# Patient Record
Sex: MALE | Race: BLACK OR AFRICAN AMERICAN | NOT HISPANIC OR LATINO | Employment: UNEMPLOYED | URBAN - METROPOLITAN AREA
[De-identification: names, ages, dates, MRNs, and addresses within clinical notes are randomized per-mention and may not be internally consistent; named-entity substitution may affect disease eponyms.]

---

## 2019-12-13 ENCOUNTER — OFFICE VISIT (OUTPATIENT)
Dept: ORTHOPEDICS | Facility: CLINIC | Age: 59
End: 2019-12-13
Payer: MEDICARE

## 2019-12-13 VITALS — WEIGHT: 209.69 LBS | BODY MASS INDEX: 31.06 KG/M2 | TEMPERATURE: 99 F | HEIGHT: 69 IN

## 2019-12-13 DIAGNOSIS — G89.29 CHRONIC PAIN OF RIGHT KNEE: Primary | ICD-10-CM

## 2019-12-13 DIAGNOSIS — M25.561 CHRONIC PAIN OF RIGHT KNEE: Primary | ICD-10-CM

## 2019-12-13 PROCEDURE — 20610 LARGE JOINT ASPIRATION/INJECTION: ICD-10-PCS | Mod: RT,S$GLB,, | Performed by: ORTHOPAEDIC SURGERY

## 2019-12-13 PROCEDURE — 20610 DRAIN/INJ JOINT/BURSA W/O US: CPT | Mod: RT,S$GLB,, | Performed by: ORTHOPAEDIC SURGERY

## 2019-12-13 PROCEDURE — 99201 PR OFFICE/OUTPT VISIT,NEW,LEVL I: CPT | Mod: 25,S$GLB,, | Performed by: ORTHOPAEDIC SURGERY

## 2019-12-13 PROCEDURE — 99201 PR OFFICE/OUTPT VISIT,NEW,LEVL I: ICD-10-PCS | Mod: 25,S$GLB,, | Performed by: ORTHOPAEDIC SURGERY

## 2019-12-13 RX ORDER — HYDROCODONE BITARTRATE AND ACETAMINOPHEN 10; 325 MG/1; MG/1
TABLET ORAL
COMMUNITY

## 2019-12-13 RX ORDER — ROFLUMILAST 500 UG/1
TABLET ORAL
COMMUNITY

## 2019-12-13 RX ORDER — TRIAMCINOLONE ACETONIDE 40 MG/ML
40 INJECTION, SUSPENSION INTRA-ARTICULAR; INTRAMUSCULAR
Status: DISCONTINUED | OUTPATIENT
Start: 2019-12-13 | End: 2019-12-13 | Stop reason: HOSPADM

## 2019-12-13 RX ORDER — TRANDOLAPRIL 4 MG/1
TABLET ORAL
COMMUNITY

## 2019-12-13 RX ORDER — MIRTAZAPINE 45 MG/1
TABLET, FILM COATED ORAL
COMMUNITY

## 2019-12-13 RX ORDER — QUETIAPINE FUMARATE 200 MG/1
TABLET, FILM COATED ORAL
COMMUNITY

## 2019-12-13 RX ORDER — IBUPROFEN 600 MG/1
600 TABLET ORAL 3 TIMES DAILY
COMMUNITY

## 2019-12-13 RX ORDER — ATORVASTATIN CALCIUM 80 MG/1
TABLET, FILM COATED ORAL
COMMUNITY

## 2019-12-13 RX ADMIN — TRIAMCINOLONE ACETONIDE 40 MG: 40 INJECTION, SUSPENSION INTRA-ARTICULAR; INTRAMUSCULAR at 09:12

## 2019-12-13 NOTE — PROGRESS NOTES
Subjective:      Patient ID: Drew Downing is a 59 y.o. male.    Chief Complaint: Pain of the Right Knee    HPI 59-year-old man with a two-month history of right knee pain which he describes as aching pain. He walks with the assistance of a cane.  He underwent a right total knee arthroplasty in 2006 and has done well until recently    Review of Systems   Constitution: Negative for fever and weight loss.   Cardiovascular: Negative for chest pain and leg swelling.   Musculoskeletal: Positive for arthritis, joint pain, joint swelling and stiffness. Negative for muscle weakness.   Gastrointestinal: Negative for change in bowel habit.   Genitourinary: Negative for bladder incontinence and hematuria.   Neurological: Negative for focal weakness, numbness, paresthesias and sensory change.         Objective:      Examination of the right knee shows a well-healed anterior scar.  There is no effusion.  He has global tenderness with palpation.  He has mild varus alignment on standing.      Ortho/SPM Exam            Assessment:       Encounter Diagnosis   Name Primary?    Chronic pain of right knee Yes          Plan:       Drew was seen today for pain.    Diagnoses and all orders for this visit:    Chronic pain of right knee  -     X-Ray Knee 1 or 2 View Right; Future     AP and lateral of the right knee shows a less than 1 mm lucency beneath the tibial component. There is no evidence of gross loosening.    The right knee is injected today.  Return 1 month for recheck

## 2019-12-13 NOTE — PROCEDURES
Large Joint Aspiration/Injection  Date/Time: 12/13/2019 9:30 AM  Performed by: Cesar Condon MD  Authorized by: Cesar Condon MD     Consent Done?:  Yes (Verbal)  Indications:  Pain  Timeout: Prior to procedure the correct patient, procedure, and site was verified    Anesthesia  Local anesthesia used  Anesthetic: lidocaine 1% without epinephrine  Anesthetic total: 2mL    Location:  Knee  Prep: Patient was prepped and draped in usual sterile fashion    Needle size:  25 G  Ultrasonic Guidance for needle placement: No  Approach:  Anteromedial  Medications:  40 mg triamcinolone acetonide 40 mg/mL  Patient tolerance:  Patient tolerated the procedure well with no immediate complications

## 2020-01-14 ENCOUNTER — TELEPHONE (OUTPATIENT)
Dept: ORTHOPEDICS | Facility: CLINIC | Age: 60
End: 2020-01-14

## 2020-01-14 NOTE — TELEPHONE ENCOUNTER
----- Message from Elisa Roach sent at 1/10/2020  1:55 PM CST -----  Tramadol is giving him stomach pains

## 2020-01-14 NOTE — TELEPHONE ENCOUNTER
1/14/20  11:00am   Spoke w/ patient.    Per Dr. Condon - Stop Tramadol.   Patient is referred back to PCP for any stronger pain medication.

## 2020-12-10 ENCOUNTER — HISTORICAL (OUTPATIENT)
Dept: ADMINISTRATIVE | Facility: HOSPITAL | Age: 60
End: 2020-12-10

## 2020-12-23 ENCOUNTER — HISTORICAL (OUTPATIENT)
Dept: RADIOLOGY | Facility: HOSPITAL | Age: 60
End: 2020-12-23

## 2022-04-10 ENCOUNTER — HISTORICAL (OUTPATIENT)
Dept: ADMINISTRATIVE | Facility: HOSPITAL | Age: 62
End: 2022-04-10
Payer: MEDICAID

## 2022-04-26 VITALS
HEIGHT: 71 IN | WEIGHT: 217.13 LBS | BODY MASS INDEX: 30.4 KG/M2 | DIASTOLIC BLOOD PRESSURE: 83 MMHG | SYSTOLIC BLOOD PRESSURE: 134 MMHG

## 2022-05-03 NOTE — HISTORICAL [facility] CERNER
This is a historical note converted from Shameka. Formatting and pictures may have been removed.  Please reference Shameka for original formatting and attached multimedia. Procedure Name  Date/Time:12/10/2020 14:38:08  Procedure:??Left?Knee Injection  Indications:??Diagnostic and Therapeutic Indication - decrease pain, increase range of motion and improve quality of life  RISKS: Possible complications with injection include?bleeding, infection (0.01%), tendon rupture, steroid flare, fat pad or soft tissue atrophy, skin depigmentation, and vasovagal response. ?(Steroid flair treatment is rest, ice, NSAIDs and resolves in 24-36 hours.)  Consent:???Procedure, risks, benefits, & alternatives explained to patient, who voiced understanding and agreed to proceed with procedure. ?Consent signed and?scanned into the medical record. No absolute contraindications (cellulitis overlying joint, infection, lack of informed consent, allergy to injection mediation, obi protein or egg allergy for sodium hyaluronate, or history of steroid flare) or relative contraindications (brittle or out of control DM HgA1c > 10, coagulopathy INR > 3.5, previous joint replacement, or history of AVN).  Description:?Time out performed. The patient was prepped?using Chlorhexidine scrub after the appropriate?identification of anatomic landmarks.? Sterile needle used (size # 21 gauge, length 1.5 inch)?Topical anesthetic of ethyl chloride was used.? ?5 mL of 1% lidocaine plain with 40 mg of Kenalog injected.  Complications:?None?  EBL:??None  Post Procedure:?Patient reports improvement in pain and ROM. Patient alert, moving all extremities. ?Good peripheral pulses, no signs of vascular compromise, range of motion intact. ?Patient tolerated procedure well. ?Aftercare instructions were given to patient at time of discharge.??Relative rest for 3 days - avoiding excessive activity. ?Pendulum stretching exercises followed by stretching exercises  daily?starting on day 4.? Place ice on area for 15 minutes every 4 to 6 hours.??Tylenol 1000mg twice a day or ibuprofen 600 mg three times per day for next 3-4 days if not on medication already. ?Protect the area for the next 1-8 hours if anesthetic was used. ?Avoid excessive activity for next 3 to 4 weeks.?ER precautions for fever, severe joint pain, or allergic reaction or other new symptoms related to joint injection.

## 2022-05-03 NOTE — HISTORICAL [facility] CERNER
This is a historical note converted from Shameka. Formatting and pictures may have been removed.  Please reference Shameka for original formatting and attached multimedia. Chief Complaint  BILATERAL KNEE PAIN  History of Present Illness  60?yo?male?smoker?  Today:??New?patient presents to ortho clinic for? ?initial visit?for???bilateral ?knee? ?pain? BMI?30?????; reports history of right TKA with stiffness and pain, and left knee without conservative treatment for severe OA.  Onset: ?Insidious over years?? ?fluctuates in intensity  Current pain level: 8/10?R?> L points to?medial knee? ?without pain medication. Quality described as??aching?.? Patient?denies?use of pain medication today.?  Medications r/t complaint: Patient reports using?RX / OTC?medications in past including:?OTC pain relievers, gabapentin, methocarbamol, Tramadol, ibuprofen, Norco. ?Currently taking?gabapentin, ibuprofen ??PRN?pain with?mild?relief.?  Modifying Factors: ?Worse with/after activity;?Improved with rest;??Stiffness after immobilization??Stiffness improved with less than 30 minutes of activity  Injury:?TKA right?knee 2014?in [facility] [place]?  Previous treatment:?HEP??denies?; RX NSAIDs, denies PT, CSI only right last injection 1 year ago ,?denies VS;?denies ?soft knee splint provided for PRN comfort;?denies ?RX off loading brace;?ongoing education on diet modification and low impact exercises as tolerated to reduce BMI  Associated Symptoms:?Crepitus/Grinding; ?No numbness or tingling;??Swelling noted to knee with increased activity;?No skin changes;?Weakness of bilateral knees with falls;?Moderate decrease in ROM;?difficulty sleeping at night s/t pain;?No locking with extension or flexion  Activity:?Sedentary, full ADLs;?Pain interferes with function/daily activity (mild)?Patient?acknowledges?use of assistive devices,?cane, ?for assistance with ambulation.  PMH:? denies CVD;?DX DM ?can not reports A1C; denies RA; denies gout; denies RX  anticoagulants; denies intolerance to NSAIDs s/t GI issues; denies ?intolerance to NSAIDs s/t kidney disease  Family History:?Family history of arthritis  PCP: Logan Gallego MD, referral source same  Employment:? Patient reports working as??, currently? ?on disability  Note:??none  Review of Systems  Constitutional:No fever;No chills;No weight loss  CV:No swelling;No edema  GI:No urinary retention;No urinary incontinence  :No fecal incontinence  Skin:No rash;No wound  Neuro:No numbness/tingling;No weakness;No saddle anesthesia  MSK: As above  Psych:No depression;No anxiety  Heme/Lymph:No easy bruising;No easy bleeding;No lymphadenopathy  Immuno:No MRSA history  Physical Exam  Vitals & Measurements  HR:?92(Peripheral)? BP:?134/83?  HT:?181.00?cm? WT:?99.300?kg? BMI:?30.31?  MSK: ?Bilateral??KNEE  General: No apparent distress and?no pain indicators in general appearance;?well-nourished  Inspection:?limping;??partial weight bearing?cane in use in clinic today;??skin and tissue swelling?right LE;?no erythema;?No contusion;?atrophy / deconditioning noted- mild quad?left only;?varus deformity?left knee?;??No prominence of the tibial tubercle?; healed surgical scar noted to anterior of right knee  Palpation:?tenderness noted at lateral and medial joint lines?left knee;?No tenderness at lateral or medial retinaculum?Crepitus:?Positive?left knee;?Normal temperature  ROM:?  ?????Active Extension/Flexion (0-140):?8-90 (R); 6-101 (L)  Strength:?  ?????Flexion??4/5  ?????Extension??4/5  Special Tests:  ?????a) Effusion Testing:  ??????????Ballotable Effusion: ?Negative  ??????????Fluid Wave:?Negative  ?????b) Patellar Testing:  ??????????Patellar Grind: ?Positive?left  ??????????Apprehension:?Negative  ??????????Patella?Facet?Tenderness:?Negative  ?????c) Meniscal Tear Assessment:?  ??????????Chanelle:?Positive?left?  ?????d) Ligamentous Testing:  ?????????ACL Anterior Drawer:?Negative  ?????????PCL Posterior  Drawer:?Negative  ??? ?????LCL Varus Test:?Negative  ?????????MCL Valgus Test:?Negative?????  Neurovascular:?Intact;? sensation intact to light touch  Neuro/Psych: Awake, Alert, Oriented; Normal mood and affect  Lymphatic: No LAD  Skin and Soft Tissue: No bruising, No ecchymosis; No rash; Skin intact  Cardiovascular: vascular integrity noted, 2+ symmetrical pulses, right LE with moderate edema below knee, asymmetrical to left.  Respiratory: no increase in respiratory effort noted  Assessment/Plan  1.?Osteoarthritis of left knee?M17.12  ?1.? Discussed with patient diagnosis and treatment recommendations.? Handout given.  2.? Imaging:?Radiological studies ordered,?reviewed,?and independently interpreted by me; discussed with patient. Noted? DJD left knee severe, right knee with TKA hardware noted, no acute findings, awaiting radiologist findings.  3.? Treatment plan: Conservative treatment to include oral glucosamine 1500 mg/day; Topical capsaicin as needed; Hot or cold therapy; Adequate vitamin C/D intake  4.? Procedure:?Discussed CSI vs VS injections as treatment options; since conservative measures did not improve symptoms patient consented for CSI today? LEFT knee only  5.? Activity:?Activity as tolerated; HEP to included aerobic conditioning with non-painful activity and ROM/STG exercises;?recommend exercise bike with RPM set at 80 or higher build to 40 minutes 3xs per week as tolerated; ?proper footwear; assistive device to avoid limping;?rubber band provided for HEP?  6.? Therapy:?none? will order at next visit if DVT r/o  7.? Medication:?First line treatment with daily ?acetaminophen 1000 mg three times daily; Medication precautions given; continue medications as RX per PCP; topical diclofenac RX PRN symptom relief, medication precautions given.  8.? RTC:?6 weeks ? to review ultrasound and RX PT if clear.  9.? Note:?Review of EMR complete with noted ? referral and PCP visit dated 8/14/20  Ordered:  Lidocaine  inj., 5 mL, form: Soln, Intra-Articular, Once, first dose 12/10/20 14:23:00 CST, stop date 12/10/20 14:23:00 CST  triamcinolone, 40 mg, form: Injection, Intra-Articular, Once, first dose 12/10/20 14:23:00 CST, stop date 12/10/20 14:23:00 CST  asp/inj jnt/bursa, major [id number] PC, 12/10/20 14:23:00 CST, [facility], Routine, 12/10/20 14:23:00 CST, Osteoarthritis of left knee  Clinic Follow up, *Est. 01/21/21 3:00:00 CST, Order for future visit, Pain and swelling of right lower leg  Osteoarthritis of left knee  Chronic knee pain after total replacement of right knee joint, [facility]  Office/Outpatient Visit Level 4 New [id number] PC, Osteoarthritis of left knee  Chronic knee pain after total replacement of right knee joint  Pain and swelling of right lower leg, [facility] 25, 12/10/20 14:23:00 CST  ?  2.?Pain and swelling of right lower leg?M79.661  ?ultrasound order of right lower extremity to r/o DVT  Ordered:  Clinic Follow up, *Est. 01/21/21 3:00:00 CST, Order for future visit, Pain and swelling of right lower leg  Osteoarthritis of left knee  Chronic knee pain after total replacement of right knee joint, [facility]  Office/Outpatient Visit Level 4 New [id number] PC, Osteoarthritis of left knee  Chronic knee pain after total replacement of right knee joint  Pain and swelling of right lower leg, [facility] 25, 12/10/20 14:23:00 CST  US NIVA Arterial Lower Ext Right, *Est. 12/12/20 3:00:00 CST, *Est. 12/12/20 3:00:00 CST, Ambulatory, Orders for Future Visit, [facility], -1, -1, 12/10/20 14:29:00 CST, Pain and swelling of right lower leg  US NIVA Venous Lower Ext Right, *Est. 12/12/20 3:00:00 CST, *Est. Stop date 12/12/20 3:00:00 CST, Ambulatory, Order for future visit, [facility], Pain and swelling of right lower leg  ?  3.?Chronic knee pain after total replacement of right knee joint?M25.561  ?same as above  Ordered:  Clinic Follow up, *Est. 01/21/21 3:00:00  CST, Order for future visit, Pain and swelling of right lower leg  Osteoarthritis of left knee  Chronic knee pain after total replacement of right knee joint, [facility]  Office/Outpatient Visit Level 4 New [id number] PC, Osteoarthritis of left knee  Chronic knee pain after total replacement of right knee joint  Pain and swelling of right lower leg, [facility] 25, 12/10/20 14:23:00 CST  XR Knee Left 4 or More Views, Routine, 12/10/20 13:42:00 CST, None, Ambulatory, Rad Type, Knee pain, bilateral, Not Scheduled, 12/10/20 13:42:00 CST  XR Knee Right 4 or More Views, Routine, 12/10/20 13:42:00 CST, None, Ambulatory, Rad Type, Knee pain, bilateral, Not Scheduled, 12/10/20 13:42:00 CST  ?  4.?Tobacco user?Z72.0  ?1.??Patient advised to discontinue smoking  2.? Education on dangers of continued smoking given  3.? Patient made aware of available smoking cessation resources  4.? Patient?declines?smoking cessation?at this time  ?  Orders:  diclofenac topical, 2 gm =, TOP, QID, PRN PRN as needed for pain, # 100 gm, 2 Refill(s), Pharmacy: [id number], 181, cm, Height/Length Dosing, 12/10/20 13:37:00 CST, 99.3, kg, Weight Dosing, 12/10/20 13:37:00 CST  Referrals  Clinic Follow up, *Est. 01/21/21 3:00:00 CST, Order for future visit, Pain and swelling of right lower leg  Osteoarthritis of left knee  Chronic knee pain after total replacement of right knee joint, [facility]   Problem List/Past Medical History  Ongoing  Diabetes  HTN - Hypertension  Paranoid schizophrenia  Tobacco user  Historical  No qualifying data  Procedure/Surgical History  Total replacement of right knee joint   Medications  atorvastatin 80 mg oral tablet, 80 mg= 1 tab(s), Oral, Daily  Daliresp 500 mcg oral tablet, 500 mcg= 1 tab(s), Oral, Daily  diclofenac 1% topical gel, 2 gm, TOP, QID, PRN, 2 refills  GABAPENTIN TAB 600MG  gabapentin 800 mg oral tablet, 800 mg= 1 tab(s), Oral, TID  ibuprofen 600 mg oral tablet, 600 mg= 1 tab(s),  Oral, TID  Incruse Ellipta 62.5 mcg/inh inhalation powder, INH, q24hr  Kenalog-40 injectable suspension, 40 mg= 1 mL, Intra-Articular, Once  latanoprost 0.005% ophthalmic solution, 1 drop(s), Eye-Both, qPM  Lidocaine 1% 10ml inj, 5 mL, Intra-Articular, Once  methocarbamol 500 mg oral tablet, 1000 mg= 2 tab(s), Oral, QID, PRN  metronidazole 500 mg oral tablet, 500 mg= 1 tab(s), Oral, TID  mirtazapine 45 mg oral tablet, 45 mg= 1 tab(s), Oral, qPM  QUETIAPINE TAB 200MG  timolol maleate 0.5% ophthalmic solution, 1 drop(s), Daily  traMADol 50 mg oral tablet, 50 mg= 1 tab(s), Oral, q6hr, PRN  TRANDOLAPRIL TAB 4MG  Allergies  No Known Allergies  Social History  Alcohol  Current, 07/22/2017  Substance Use  Past, 07/22/2017  Tobacco  5-9 cigarettes (between 1/4 to 1/2 pack)/day in last 30 days, Cigarettes, No, 12/10/2020  Current every day smoker, 07/22/2017  Health Maintenance  Health Maintenance  ???Pending?(in the next year)  ??? ??OverDue  ??? ? ? ?Smoking Cessation due??01/01/20??and every 1??year(s)  ??? ? ? ?Alcohol Misuse Screening due??01/02/20??and every 1??year(s)  ??? ??Due?  ??? ? ? ?ADL Screening due??12/10/20??and every 1??year(s)  ??? ? ? ?Aspirin Therapy for CVD Prevention due??12/10/20??and every 1??year(s)  ??? ? ? ?Hypertension Management-Education due??12/10/20??and every 1??year(s)  ??? ? ? ?Lung Cancer Screening due??12/10/20??and every 1??year(s)  ??? ? ? ?Medicare Annual Wellness Exam due??12/10/20??and every 1??year(s)  ??? ? ? ?Tetanus Vaccine due??12/10/20??and every 10??year(s)  ??? ??Due In Future?  ??? ? ? ?Obesity Screening not due until??01/01/21??and every 1??year(s)  ???Satisfied?(in the past 1 year)  ??? ??Satisfied?  ??? ? ? ?Blood Pressure Screening on??12/10/20.??Satisfied by Renna Fisher  ??? ? ? ?Body Mass Index Check on??12/10/20.??Satisfied by Renan Fisher  ??? ? ? ?Depression Screening on??12/10/20.??Satisfied by Renan Fisher  ??? ? ? ?Hypertension Management-Blood  Pressure on??12/10/20.??Satisfied by Renan Fisher  ??? ? ? ?Obesity Screening on??12/10/20.??Satisfied by Renan Fisher  ?  Diagnostic Results  XR bilateral knees obtained 12/10/20, no acute findings, DJD left knee noted, hardware TKA right knee noted, awaiting radiologist findings.

## 2022-10-20 DIAGNOSIS — M25.569 KNEE PAIN, UNSPECIFIED CHRONICITY, UNSPECIFIED LATERALITY: Primary | ICD-10-CM
